# Patient Record
(demographics unavailable — no encounter records)

---

## 2024-11-04 NOTE — HEALTH RISK ASSESSMENT
[Good] : ~his/her~  mood as  good [No] : No [Never] : Never [de-identified] : Exercises twice per week

## 2024-11-04 NOTE — HISTORY OF PRESENT ILLNESS
[FreeTextEntry1] : Here for CPE Pt has cough for 1 week that has improved.  Otherwise feeling well  [de-identified] : Never a smoker. No alcohol. Last mammogram about 1 year ago. Last GYN check-up 1 year ago. Exercises twice per week.

## 2024-11-04 NOTE — HISTORY OF PRESENT ILLNESS
[FreeTextEntry1] : Here for CPE Pt has cough for 1 week that has improved.  Otherwise feeling well  [de-identified] : Never a smoker. No alcohol. Last mammogram about 1 year ago. Last GYN check-up 1 year ago. Exercises twice per week.

## 2024-11-04 NOTE — HEALTH RISK ASSESSMENT
[Good] : ~his/her~  mood as  good [No] : No [Never] : Never [de-identified] : Exercises twice per week